# Patient Record
Sex: FEMALE | Employment: FULL TIME | URBAN - METROPOLITAN AREA
[De-identification: names, ages, dates, MRNs, and addresses within clinical notes are randomized per-mention and may not be internally consistent; named-entity substitution may affect disease eponyms.]

---

## 2017-02-11 ENCOUNTER — TELEPHONE (OUTPATIENT)
Dept: UROLOGY | Facility: HOSPITAL | Age: 62
End: 2017-02-11

## 2017-02-11 RX ORDER — ATOMOXETINE 60 MG/1
CAPSULE ORAL DAILY
COMMUNITY

## 2017-02-11 RX ORDER — DEXMETHYLPHENIDATE HYDROCHLORIDE 5 MG/1
5 TABLET ORAL 2 TIMES DAILY
COMMUNITY

## 2017-02-11 NOTE — TELEPHONE ENCOUNTER
Referred by Dr. Cruz Bowman for prolapse, difficulty emptying bladder, has to push to void, c/o bulge in vagina, leaking with position change,

## 2017-02-17 ENCOUNTER — OFFICE VISIT (OUTPATIENT)
Dept: UROLOGY | Facility: HOSPITAL | Age: 62
End: 2017-02-17
Attending: OBSTETRICS & GYNECOLOGY
Payer: COMMERCIAL

## 2017-02-17 VITALS
BODY MASS INDEX: 27.06 KG/M2 | WEIGHT: 189 LBS | HEIGHT: 70 IN | DIASTOLIC BLOOD PRESSURE: 82 MMHG | SYSTOLIC BLOOD PRESSURE: 138 MMHG

## 2017-02-17 DIAGNOSIS — N81.4 UTERINE PROLAPSE: Primary | ICD-10-CM

## 2017-02-17 DIAGNOSIS — N81.11 MIDLINE CYSTOCELE: ICD-10-CM

## 2017-02-17 DIAGNOSIS — IMO0002 CYSTOCELE: ICD-10-CM

## 2017-02-17 DIAGNOSIS — N95.2 POSTMENOPAUSAL ATROPHIC VAGINITIS: ICD-10-CM

## 2017-02-17 DIAGNOSIS — N81.6 RECTOCELE: ICD-10-CM

## 2017-02-17 LAB
BLOOD URINE: NEGATIVE
CONTROL RUN WITHIN 24 HOURS?: YES
NITRITE URINE: NEGATIVE

## 2017-02-17 PROCEDURE — 81002 URINALYSIS NONAUTO W/O SCOPE: CPT

## 2017-02-17 PROCEDURE — 87086 URINE CULTURE/COLONY COUNT: CPT | Performed by: OBSTETRICS & GYNECOLOGY

## 2017-02-17 PROCEDURE — 99201 HC OUTPT EVAL AND MGNT NEW PT LEVEL 1: CPT

## 2017-02-17 RX ORDER — ESTRADIOL 0.1 MG/G
CREAM VAGINAL
Qty: 1 TUBE | Refills: 3 | Status: SHIPPED | OUTPATIENT
Start: 2017-02-17 | End: 2020-10-19

## 2017-02-19 NOTE — PATIENT INSTRUCTIONS
[address] MEDICINE    HAVING A URINARY CATHETER AFTER SURGERY    What is a urinary catheter? A catheter is a soft tube used to drain urine from your bladder. Why do I need a catheter?   A urinary catheter is used to help with heali following tips:  · Urinate normally then stand up, walk around for a minute or two, sit back down on the commode and attempt to urinate (double void). · Sit in a tub of warm water and try to urinate in the tub.   · Run warm water over your vaginal area whi are given an appointment to come back to discuss the results and any appropriate treatment recommendations. Please do not hesitate to contact our office with any questions or concerns at [phone].     I acknowledge that I have received verbal and wri

## 2017-02-20 ENCOUNTER — OFFICE VISIT (OUTPATIENT)
Dept: UROLOGY | Facility: HOSPITAL | Age: 62
End: 2017-02-20
Attending: OBSTETRICS & GYNECOLOGY
Payer: COMMERCIAL

## 2017-02-20 ENCOUNTER — TELEPHONE (OUTPATIENT)
Dept: UROLOGY | Facility: HOSPITAL | Age: 62
End: 2017-02-20

## 2017-02-20 VITALS
WEIGHT: 189 LBS | BODY MASS INDEX: 27.99 KG/M2 | SYSTOLIC BLOOD PRESSURE: 134 MMHG | DIASTOLIC BLOOD PRESSURE: 80 MMHG | HEIGHT: 69 IN

## 2017-02-20 DIAGNOSIS — N39.3 SUI (STRESS URINARY INCONTINENCE, FEMALE): Primary | ICD-10-CM

## 2017-02-20 LAB
CONTROL RUN WITHIN 24 HOURS?: YES
LEUKOCYTE ESTERASE URINE: NEGATIVE
NITRITE URINE: NEGATIVE

## 2017-02-20 PROCEDURE — 51741 ELECTRO-UROFLOWMETRY FIRST: CPT

## 2017-02-20 PROCEDURE — 51797 INTRAABDOMINAL PRESSURE TEST: CPT

## 2017-02-20 PROCEDURE — 51729 CYSTOMETROGRAM W/VP&UP: CPT

## 2017-02-20 PROCEDURE — 51784 ANAL/URINARY MUSCLE STUDY: CPT

## 2017-02-20 NOTE — PROCEDURES
.Patient here for urodynamic testing. Procedure explained and confirmed by patient. See evaluation form for results. Both verbal and written discharge instructions were given.   Patient tolerated procedure well and will follow up with Dr. Magalis Barnett on 250  mL  Pattern:  []  Normal  []  Poor flow     [x]  Intermittent  []  Other  Void:   [x]  Typical  []  Atypical    Additional Notes: Patient reports this is a normal void and felt she emptied     CYSTOMETRY:  Urethral Catheter:  Fr 7 / tdoc  Abd Cathete Non-Reactive    2/20/2017 1:51 PM     PERFORMED BY:  Last Zapata RN

## 2017-03-08 ENCOUNTER — OFFICE VISIT (OUTPATIENT)
Dept: UROLOGY | Facility: HOSPITAL | Age: 62
End: 2017-03-08
Attending: OBSTETRICS & GYNECOLOGY
Payer: COMMERCIAL

## 2017-03-08 VITALS
DIASTOLIC BLOOD PRESSURE: 72 MMHG | HEIGHT: 69 IN | WEIGHT: 184 LBS | BODY MASS INDEX: 27.25 KG/M2 | SYSTOLIC BLOOD PRESSURE: 124 MMHG

## 2017-03-08 DIAGNOSIS — N39.3 SUI (STRESS URINARY INCONTINENCE, FEMALE): Primary | ICD-10-CM

## 2017-03-08 DIAGNOSIS — N95.2 POSTMENOPAUSAL ATROPHIC VAGINITIS: ICD-10-CM

## 2017-03-08 DIAGNOSIS — N81.4 UTERINE PROLAPSE: ICD-10-CM

## 2017-03-08 PROCEDURE — 99211 OFF/OP EST MAY X REQ PHY/QHP: CPT

## 2017-03-24 ENCOUNTER — TELEPHONE (OUTPATIENT)
Dept: UROLOGY | Facility: HOSPITAL | Age: 62
End: 2017-03-24

## 2017-03-24 NOTE — TELEPHONE ENCOUNTER
LM for patient to call back with exactly what papers she needs for surgery that is scheduled for 5/9/17  USLS/VH/ BSO with possible MUS cysto

## 2017-05-15 ENCOUNTER — OFFICE VISIT (OUTPATIENT)
Dept: UROLOGY | Facility: HOSPITAL | Age: 62
End: 2017-05-15
Attending: OBSTETRICS & GYNECOLOGY
Payer: COMMERCIAL

## 2017-05-15 VITALS
DIASTOLIC BLOOD PRESSURE: 80 MMHG | WEIGHT: 184 LBS | HEIGHT: 69 IN | SYSTOLIC BLOOD PRESSURE: 115 MMHG | BODY MASS INDEX: 27.25 KG/M2 | TEMPERATURE: 97 F

## 2017-05-15 DIAGNOSIS — N99.89 POSTOPERATIVE URINARY RETENTION: Primary | ICD-10-CM

## 2017-05-15 DIAGNOSIS — R33.8 POSTOPERATIVE URINARY RETENTION: Primary | ICD-10-CM

## 2017-05-15 PROCEDURE — 99212 OFFICE O/P EST SF 10 MIN: CPT

## 2017-05-15 RX ORDER — IBUPROFEN 600 MG/1
600 TABLET ORAL EVERY 6 HOURS PRN
COMMUNITY
End: 2017-06-28

## 2017-05-15 NOTE — PATIENT INSTRUCTIONS
Voiding Trial Instructions  You have passed your voiding trial at 0900. Please make sure you are drinking some water today. You can take your Motrin to help with any swelling from the catheter.   It is important to try and empty your bladder every two tawanna

## 2017-05-15 NOTE — PROCEDURES
.Patient here for voiding trial.  Jalloh catheter drained and then using a 60 cc Elle syringe, 300 ml sterile water was instilled per gravity, balloon deflated using 10 cc syringe, and catheter removed.   Pt up to bathroom and voided 300 mL,  Patient passe

## 2017-06-20 ENCOUNTER — TELEPHONE (OUTPATIENT)
Dept: UROLOGY | Facility: HOSPITAL | Age: 62
End: 2017-06-20

## 2017-06-20 NOTE — TELEPHONE ENCOUNTER
Pt called saying she is post op from 5/9/17 w/ Dyllan and Sonali. Pt states she had be dx w/ BV June 2. Shortly after she had a UTI which she went to an urgent care for tx. They did a culture, and changed abx after culture was finalized.  On June 16th, she had

## 2017-06-27 ENCOUNTER — TELEPHONE (OUTPATIENT)
Dept: UROLOGY | Facility: HOSPITAL | Age: 62
End: 2017-06-27

## 2017-06-27 NOTE — TELEPHONE ENCOUNTER
Post op from 5/9/17 - Experiencing urgency intermittently - has had one UTI post op and most recently had urgency symptoms with a negative culture - took AZO over the counter with relief . Pt feels urgency and bladder spasm subsides after she voids- Patient

## 2017-06-28 ENCOUNTER — OFFICE VISIT (OUTPATIENT)
Dept: UROLOGY | Facility: HOSPITAL | Age: 62
End: 2017-06-28
Attending: OBSTETRICS & GYNECOLOGY
Payer: COMMERCIAL

## 2017-06-28 VITALS
HEIGHT: 69 IN | BODY MASS INDEX: 27.25 KG/M2 | SYSTOLIC BLOOD PRESSURE: 118 MMHG | WEIGHT: 184 LBS | DIASTOLIC BLOOD PRESSURE: 70 MMHG

## 2017-06-28 DIAGNOSIS — Z98.890 POST-OPERATIVE STATE: Primary | ICD-10-CM

## 2017-06-28 DIAGNOSIS — R39.15 URINARY URGENCY: ICD-10-CM

## 2017-06-28 PROCEDURE — 99211 OFF/OP EST MAY X REQ PHY/QHP: CPT

## 2017-06-28 PROCEDURE — 87086 URINE CULTURE/COLONY COUNT: CPT | Performed by: OBSTETRICS & GYNECOLOGY

## 2017-06-28 PROCEDURE — 81002 URINALYSIS NONAUTO W/O SCOPE: CPT

## 2017-06-30 ENCOUNTER — TELEPHONE (OUTPATIENT)
Dept: UROLOGY | Facility: HOSPITAL | Age: 62
End: 2017-06-30

## 2017-11-10 ENCOUNTER — OFFICE VISIT (OUTPATIENT)
Dept: UROLOGY | Facility: HOSPITAL | Age: 62
End: 2017-11-10
Attending: OBSTETRICS & GYNECOLOGY
Payer: COMMERCIAL

## 2017-11-10 VITALS — RESPIRATION RATE: 20 BRPM | WEIGHT: 184 LBS | BODY MASS INDEX: 27.25 KG/M2 | HEIGHT: 69 IN

## 2017-11-10 DIAGNOSIS — R35.0 FREQUENCY OF URINATION: ICD-10-CM

## 2017-11-10 DIAGNOSIS — Z98.890 POST-OPERATIVE STATE: Primary | ICD-10-CM

## 2017-11-10 PROCEDURE — 87086 URINE CULTURE/COLONY COUNT: CPT | Performed by: OBSTETRICS & GYNECOLOGY

## 2017-11-10 PROCEDURE — 99211 OFF/OP EST MAY X REQ PHY/QHP: CPT

## 2017-11-10 PROCEDURE — 81002 URINALYSIS NONAUTO W/O SCOPE: CPT

## 2017-11-10 RX ORDER — AMOXICILLIN AND CLAVULANATE POTASSIUM 875; 125 MG/1; MG/1
1 TABLET, FILM COATED ORAL 2 TIMES DAILY
COMMUNITY
Start: 2017-11-06 | End: 2017-11-16

## 2017-11-10 RX ORDER — PRAVASTATIN SODIUM 20 MG
40 TABLET ORAL NIGHTLY
COMMUNITY

## 2017-11-13 ENCOUNTER — TELEPHONE (OUTPATIENT)
Dept: UROLOGY | Facility: HOSPITAL | Age: 62
End: 2017-11-13

## 2017-12-20 ENCOUNTER — TELEPHONE (OUTPATIENT)
Dept: UROLOGY | Facility: HOSPITAL | Age: 62
End: 2017-12-20

## 2017-12-20 DIAGNOSIS — R39.9 UTI SYMPTOMS: Primary | ICD-10-CM

## 2017-12-20 NOTE — TELEPHONE ENCOUNTER
Pt called w/ what she thinks is UTI sx again. Last cx 11/10/17, which was neg. Pt having dysuria and bladder pain which wakes her up at night. Been going on for a few weeks. Denies fever. Pt is using her estrace.  Also discussed it could be something in her

## 2017-12-26 ENCOUNTER — TELEPHONE (OUTPATIENT)
Dept: UROLOGY | Facility: HOSPITAL | Age: 62
End: 2017-12-26

## 2017-12-26 NOTE — TELEPHONE ENCOUNTER
Pt called back saying she is feeling better. Had urine cx done at [facility] and it showed 80,000 GBS. Informed pt this is not considered a UTI, but instead her britt. Sometimes we will treat GBS when pt is symptomatic.  Since pt is feeling better after taking

## 2017-12-26 NOTE — TELEPHONE ENCOUNTER
Phoned pt and LM regarding pt getting UTI screening. Last spoke to pt 12/20 and pt had UTI sx. Plan was for pt to go to an urgent care and have results faxed to our office. LM asking pt if she was feeling better. No abx were started.  Request pt call office

## 2018-03-11 ENCOUNTER — HOSPITAL ENCOUNTER (OUTPATIENT)
Age: 63
Discharge: HOME | End: 2018-03-11
Attending: FAMILY MEDICINE
Payer: COMMERCIAL

## 2018-03-11 VITALS
BODY MASS INDEX: 23.62 KG/M2 | WEIGHT: 165 LBS | HEIGHT: 70 IN | DIASTOLIC BLOOD PRESSURE: 77 MMHG | OXYGEN SATURATION: 100 % | TEMPERATURE: 98 F | RESPIRATION RATE: 18 BRPM | HEART RATE: 103 BPM | SYSTOLIC BLOOD PRESSURE: 155 MMHG

## 2018-03-11 DIAGNOSIS — N39.0 ACUTE UTI: Primary | ICD-10-CM

## 2018-03-11 LAB
URINE BILIRUBIN: NEGATIVE
URINE COLOR: YELLOW
URINE GLUCOSE: NEGATIVE MG/DL
URINE KETONES: NEGATIVE MG/DL
URINE NITRITE: POSITIVE
URINE PH: 6.5
URINE SPECIFIC GRAVITY: 1.01
URINE UROBILINOGEN: 0.2 MG/DL

## 2018-03-11 PROCEDURE — 99214 OFFICE O/P EST MOD 30 MIN: CPT

## 2018-03-11 PROCEDURE — 87086 URINE CULTURE/COLONY COUNT: CPT | Performed by: FAMILY MEDICINE

## 2018-03-11 PROCEDURE — 87088 URINE BACTERIA CULTURE: CPT | Performed by: FAMILY MEDICINE

## 2018-03-11 PROCEDURE — 87186 SC STD MICRODIL/AGAR DIL: CPT | Performed by: FAMILY MEDICINE

## 2018-03-11 PROCEDURE — 81002 URINALYSIS NONAUTO W/O SCOPE: CPT

## 2018-03-11 RX ORDER — NITROFURANTOIN 25; 75 MG/1; MG/1
100 CAPSULE ORAL 2 TIMES DAILY
Qty: 14 CAPSULE | Refills: 0 | Status: SHIPPED | OUTPATIENT
Start: 2018-03-11 | End: 2018-04-02 | Stop reason: ALTCHOICE

## 2018-03-11 NOTE — ED PROVIDER NOTES
Patient presents with:  Urinary Symptoms (urologic)    HPI:     Darrin Duff is a 63year old female who presents with a chief complaint of dysuria, frequency, urgency of urination, pelvic cramping  Onset of symptoms: 4 days ago  Symptoms reported to be  [place] Urine Negative Negative   Ketone, Urine Negative Negative mg/dL   Specific Gravity, Urine 1.015 1.005 - 1.030   Blood, Urine Moderate (A) Negative   PH, Urine 6.5 5.0 - 8.0   Protein urine 30 mg/dL (A) Negative mg /dL   Urobilinogen urine 0.2 <2.0 mg/dL

## 2018-03-12 ENCOUNTER — TELEPHONE (OUTPATIENT)
Dept: UROLOGY | Facility: HOSPITAL | Age: 63
End: 2018-03-12

## 2018-03-12 NOTE — TELEPHONE ENCOUNTER
Reports UTI symptoms and sought rx at [facility] - Started on Macrobid urine culture is pending - symptoms are improving - will follow culture

## 2018-03-13 ENCOUNTER — TELEPHONE (OUTPATIENT)
Dept: UROLOGY | Facility: HOSPITAL | Age: 63
End: 2018-03-13

## 2018-03-13 NOTE — ED NOTES
Pt returned the call. Notes she is only feeling mildly better but followed up with her uro-gyne already who works for [facility] and will be keeping an eye out for her urine culture results and following up with her.

## 2018-03-13 NOTE — TELEPHONE ENCOUNTER
Pt called to check on urine culture results, culture positive. Pt is on Macrobid and this bacteria is sensitive to Macrobid, explained pt should continue Macrobid as directed for 7 days.   Pt reports she was feeling better yesterday but is experiencing more

## 2018-03-14 ENCOUNTER — TELEPHONE (OUTPATIENT)
Dept: UROLOGY | Facility: HOSPITAL | Age: 63
End: 2018-03-14

## 2018-03-14 NOTE — TELEPHONE ENCOUNTER
Pt LMOR stating she went to the ER last night and was treated for a kidney infection, asking we call her back, attempted to return the call, left a detailed message to call our office with infor regarding where she went and if her antibiotic was switched

## 2018-03-14 NOTE — TELEPHONE ENCOUNTER
Pt returned our previous call. Reports she went to [facility] ER last night with temp 100.0, ER completed blood work, urine sample and CT. Dx pyelonephritis, treated with IV antibiotics and then placed on PO Keflex 500mg PO QID.   Home today, reports back p

## 2018-04-02 ENCOUNTER — OFFICE VISIT (OUTPATIENT)
Dept: UROLOGY | Facility: HOSPITAL | Age: 63
End: 2018-04-02
Attending: OBSTETRICS & GYNECOLOGY
Payer: COMMERCIAL

## 2018-04-02 VITALS
DIASTOLIC BLOOD PRESSURE: 80 MMHG | BODY MASS INDEX: 23.62 KG/M2 | WEIGHT: 165 LBS | HEIGHT: 70 IN | SYSTOLIC BLOOD PRESSURE: 124 MMHG

## 2018-04-02 DIAGNOSIS — Z98.890 POST-OPERATIVE STATE: Primary | ICD-10-CM

## 2018-04-02 DIAGNOSIS — R39.15 URINARY URGENCY: ICD-10-CM

## 2018-04-02 PROCEDURE — 99211 OFF/OP EST MAY X REQ PHY/QHP: CPT

## 2018-04-02 PROCEDURE — 81002 URINALYSIS NONAUTO W/O SCOPE: CPT

## 2018-04-02 RX ORDER — CEPHALEXIN 250 MG/1
250 CAPSULE ORAL EVERY 6 HOURS
Qty: 90 CAPSULE | Refills: 3 | Status: SHIPPED | OUTPATIENT
Start: 2018-04-02 | End: 2020-05-29

## 2018-06-14 ENCOUNTER — TELEPHONE (OUTPATIENT)
Dept: UROLOGY | Facility: HOSPITAL | Age: 63
End: 2018-06-14

## 2018-06-14 DIAGNOSIS — R39.9 UTI SYMPTOMS: Primary | ICD-10-CM

## 2018-06-14 NOTE — TELEPHONE ENCOUNTER
Pt called saying she has had back pain for the last few months similar to when she had a \"kidney infection\" in March. Looking back at notes, it looks like she went to ER for \"actue UTI\" per ER doctor notes. Discussed w/ pt bladder vs kidney infection.

## 2018-06-18 ENCOUNTER — APPOINTMENT (OUTPATIENT)
Dept: LAB | Age: 63
End: 2018-06-18
Attending: OBSTETRICS & GYNECOLOGY
Payer: COMMERCIAL

## 2018-06-18 DIAGNOSIS — R39.9 UTI SYMPTOMS: ICD-10-CM

## 2018-06-18 PROCEDURE — 87086 URINE CULTURE/COLONY COUNT: CPT

## 2018-07-09 ENCOUNTER — OFFICE VISIT (OUTPATIENT)
Dept: UROLOGY | Facility: HOSPITAL | Age: 63
End: 2018-07-09
Attending: OBSTETRICS & GYNECOLOGY
Payer: COMMERCIAL

## 2018-07-09 VITALS
DIASTOLIC BLOOD PRESSURE: 78 MMHG | BODY MASS INDEX: 23.62 KG/M2 | SYSTOLIC BLOOD PRESSURE: 138 MMHG | WEIGHT: 165 LBS | HEIGHT: 70 IN

## 2018-07-09 DIAGNOSIS — N39.0 RECURRENT UTI: ICD-10-CM

## 2018-07-09 DIAGNOSIS — N39.41 URGE INCONTINENCE: ICD-10-CM

## 2018-07-09 DIAGNOSIS — N95.2 POSTMENOPAUSAL ATROPHIC VAGINITIS: Primary | ICD-10-CM

## 2018-07-09 LAB
CONTROL RUN WITHIN 24 HOURS?: YES
LEUKOCYTE ESTERASE URINE: NEGATIVE
NITRITE URINE: NEGATIVE

## 2018-07-09 PROCEDURE — 81002 URINALYSIS NONAUTO W/O SCOPE: CPT

## 2018-07-09 PROCEDURE — 99211 OFF/OP EST MAY X REQ PHY/QHP: CPT

## 2020-05-29 ENCOUNTER — TELEPHONE (OUTPATIENT)
Dept: UROLOGY | Facility: HOSPITAL | Age: 65
End: 2020-05-29

## 2020-05-29 RX ORDER — CEPHALEXIN 250 MG/1
250 CAPSULE ORAL DAILY
Qty: 30 CAPSULE | Refills: 0 | Status: SHIPPED | OUTPATIENT
Start: 2020-05-29

## 2020-05-29 NOTE — TELEPHONE ENCOUNTER
Received refill request for Keflex suppression from pharmacy. Pt last seen 7-9-18 by Dr. Merlin Farah, was to change to QOD Keflex suppression x 6 months at that time. Called pt, she reports she uses Keflex only for a couple days if she feels UTI sx coming on.

## 2020-10-12 ENCOUNTER — OFFICE VISIT (OUTPATIENT)
Dept: UROLOGY | Facility: HOSPITAL | Age: 65
End: 2020-10-12
Attending: OBSTETRICS & GYNECOLOGY
Payer: COMMERCIAL

## 2020-10-12 VITALS
WEIGHT: 165 LBS | SYSTOLIC BLOOD PRESSURE: 114 MMHG | DIASTOLIC BLOOD PRESSURE: 62 MMHG | HEIGHT: 70 IN | BODY MASS INDEX: 23.62 KG/M2

## 2020-10-12 DIAGNOSIS — N95.2 POSTMENOPAUSAL ATROPHIC VAGINITIS: ICD-10-CM

## 2020-10-12 DIAGNOSIS — Z87.440 HISTORY OF RECURRENT UTI (URINARY TRACT INFECTION): Primary | ICD-10-CM

## 2020-10-12 PROCEDURE — 81003 URINALYSIS AUTO W/O SCOPE: CPT | Performed by: OBSTETRICS & GYNECOLOGY

## 2020-10-12 PROCEDURE — 87086 URINE CULTURE/COLONY COUNT: CPT | Performed by: OBSTETRICS & GYNECOLOGY

## 2020-10-12 PROCEDURE — 99212 OFFICE O/P EST SF 10 MIN: CPT

## 2020-10-12 RX ORDER — ALENDRONATE SODIUM 70 MG/1
70 TABLET ORAL WEEKLY
COMMUNITY

## 2020-10-12 RX ORDER — MELATONIN
1000 2 TIMES DAILY
COMMUNITY

## 2020-10-12 RX ORDER — FOLIC ACID 1 MG/1
TABLET ORAL
COMMUNITY

## 2020-10-14 ENCOUNTER — LAB ENCOUNTER (OUTPATIENT)
Dept: LAB | Facility: REFERENCE LAB | Age: 65
End: 2020-10-14
Attending: FAMILY MEDICINE
Payer: COMMERCIAL

## 2020-10-14 ENCOUNTER — TELEPHONE (OUTPATIENT)
Dept: UROLOGY | Facility: HOSPITAL | Age: 65
End: 2020-10-14

## 2020-10-14 DIAGNOSIS — R35.0 FREQUENCY OF MICTURITION: ICD-10-CM

## 2020-10-14 DIAGNOSIS — R39.15 URGENCY OF MICTURITION: ICD-10-CM

## 2020-10-14 DIAGNOSIS — R35.0 FREQUENCY OF MICTURITION: Primary | ICD-10-CM

## 2020-10-14 PROCEDURE — 87086 URINE CULTURE/COLONY COUNT: CPT

## 2020-10-14 NOTE — TELEPHONE ENCOUNTER
Pt calling this am w/ c/o a uti. Pt sx are frequency, urgency and \"bladder pain\". Discussed w/ pt she has a neg ucx results today from specimen collected Monday. Pt reports she often has neg ucx, but has sx.  Pt is seeing a rheumatologist for a workup of

## 2020-10-15 NOTE — TELEPHONE ENCOUNTER
.Phoned patient and informed of normal urine culture results. Patient verbalized an understanding. She states her bladder pain was so bad this am, she was unable to go in to work. States she took AZO yesterday and felt it resolved her pain.   Suggested she

## 2020-10-19 ENCOUNTER — TELEPHONE (OUTPATIENT)
Dept: UROLOGY | Facility: HOSPITAL | Age: 65
End: 2020-10-19

## 2020-10-19 RX ORDER — ESTRADIOL 0.1 MG/G
CREAM VAGINAL
Qty: 1 TUBE | Refills: 3 | Status: SHIPPED | OUTPATIENT
Start: 2020-10-19

## (undated) NOTE — MR AVS SNAPSHOT
[address]  [address]  [address] [place] [address]  [phone]               Thank you for choosing us for your health care visit with Thom Martínez.   We are glad to serve you and happy to provide you with this summar uncomfortable and you will need to come back into the office. If it is after 4pm, go to an urgent care or emergency room to have a catheter placed again. Please call our office at [phone] if you have any questions or concerns.          [email or web address]

## (undated) NOTE — Clinical Note
Consent to Procedure/Sedation    Date: __2/20//2017_____    Time: __9 30 am___    1. I authorize the performance upon Inis Shear the following:  Urodynamics (UDS)      2.  Gregory Lyon(and whomever is designated as the doctor’s assistant), to ___________________________    ___________________    Witness: ____________________     Date: ______________    Printed: 2017   2:13 PM    Patient Name: Mariely Ibarra        : 1955       Medical Record #: [id number]

## (undated) NOTE — MR AVS SNAPSHOT
[address]  [address]  [address] [place] [address]  [phone]               Thank you for choosing us for your health care visit with Jered Lin.   We are glad to serve you and happy to provide you with this summar you are not able to empty the specific amount. If the catheter is replaced, your nurse will discuss with you when you need to return. What do I do after the catheter is removed?   · For the first 24 hours, you should go to the bathroom with your first u There may be some mild burning when you urinate or you may see some blood in your urine. These problems should not last more than 24 hours. The following suggestions may minimize any symptoms you experience.     · Drink 6-8 large glasses of water over the 134/80 mmHg 69\" 189 lb 27.90 kg/m2          Current Medications          This list is accurate as of: 2/20/17  3:11 PM.  Always use your most recent med list.                Atomoxetine HCl 60 MG Caps   Take by mouth daily.    Commonly known as:  CAROLYN